# Patient Record
Sex: MALE | Race: WHITE | NOT HISPANIC OR LATINO | URBAN - METROPOLITAN AREA
[De-identification: names, ages, dates, MRNs, and addresses within clinical notes are randomized per-mention and may not be internally consistent; named-entity substitution may affect disease eponyms.]

---

## 2020-07-25 ENCOUNTER — TELEPHONE ENCOUNTER (OUTPATIENT)
Dept: URBAN - METROPOLITAN AREA CLINIC 13 | Facility: CLINIC | Age: 85
End: 2020-07-25

## 2020-07-25 RX ORDER — NITAZOXANIDE 500 MG/1
TAKE 1 TABLET TWICE DAILY TABLET ORAL
Qty: 6 | Refills: 0 | OUTPATIENT
Start: 2012-08-27 | End: 2012-11-09

## 2020-07-25 RX ORDER — LEVALBUTEROL TARTRATE 45 UG/1
TAKE 2 PUFFS TWICE DAILY AEROSOL, METERED ORAL
Refills: 0 | OUTPATIENT
Start: 2008-10-17 | End: 2011-10-05

## 2020-07-25 RX ORDER — HYDROCORTISONE ACETATE 25 MG/1
INSERT 1 SUPP BEDTIME  PR X 7 DAYS SUPPOSITORY RECTAL
Qty: 10 | Refills: 0 | OUTPATIENT
Start: 2013-11-12 | End: 2014-01-22

## 2020-07-25 RX ORDER — POTASSIUM CHLORIDE 20 MEQ/1
TAKE 1 TABLET DAILY TABLET, EXTENDED RELEASE ORAL
Refills: 0 | OUTPATIENT
End: 2014-01-22

## 2020-07-25 RX ORDER — HYDROCORTISONE ACETATE 25 MG/1
INSERT 1 SUPP BEDTIME X 10 DAYS SUPPOSITORY RECTAL
Qty: 10 | Refills: 1 | OUTPATIENT
Start: 2013-12-18 | End: 2014-01-22

## 2020-07-25 RX ORDER — CHOLESTYRAMINE 4 G/9G
TAKE 1 SCOOP DAILY POWDER, FOR SUSPENSION ORAL
Qty: 30 | Refills: 5 | OUTPATIENT
Start: 2012-06-25 | End: 2017-04-26

## 2020-07-25 RX ORDER — ATORVASTATIN CALCIUM 40 MG/1
TABLET, FILM COATED ORAL
Qty: 90 | Refills: 0 | OUTPATIENT
Start: 2012-12-03 | End: 2013-11-12

## 2020-07-25 RX ORDER — CHOLESTYRAMINE 4 G/9G
POWDER, FOR SUSPENSION ORAL
Qty: 255 | Refills: 0 | OUTPATIENT
Start: 2012-02-01 | End: 2012-06-25

## 2020-07-25 RX ORDER — LISINOPRIL 20 MG/1
TAKE 1 TABLET DAILY TABLET ORAL
Refills: 0 | OUTPATIENT
Start: 2012-04-03 | End: 2012-11-09

## 2020-07-25 RX ORDER — WHEAT DEXTRIN 3 G/4 G
USE AS DIRECTED POWDER (GRAM) ORAL
Refills: 0 | OUTPATIENT
End: 2012-08-27

## 2020-07-25 RX ORDER — MELOXICAM 7.5 MG/1
TAKE 1 TABLET DAILY TABLET ORAL
Refills: 0 | OUTPATIENT
End: 2017-04-26

## 2020-07-25 RX ORDER — ATORVASTATIN CALCIUM 80 MG
TAKE 1 TABLET AT BEDTIME TABLET ORAL
Refills: 0 | OUTPATIENT
Start: 2005-11-21 | End: 2011-10-05

## 2020-07-25 RX ORDER — TIMOLOL MALEATE 5 MG/ML
SOLUTION/ DROPS OPHTHALMIC
Qty: 10 | Refills: 0 | OUTPATIENT
Start: 2012-02-13 | End: 2013-11-12

## 2020-07-25 RX ORDER — OLMESARTAN MEDOXOMIL 20 MG/1
TABLET, FILM COATED ORAL
Qty: 30 | Refills: 0 | OUTPATIENT
Start: 2012-09-28 | End: 2013-03-04

## 2020-07-25 RX ORDER — DICLOFENAC SODIUM 1 %
APPLY SPARINGLY TO AFFECTED AREA(S) ONCE DAILY GEL (GRAM) TOPICAL
Refills: 0 | OUTPATIENT
Start: 2011-10-05 | End: 2014-01-22

## 2020-07-25 RX ORDER — SERTRALINE HCL 25 MG
TAKE 1 TABLET DAILY TABLET ORAL
Refills: 0 | OUTPATIENT
Start: 2005-11-21 | End: 2011-09-26

## 2020-07-25 RX ORDER — DUTASTERIDE 0.5 MG
TAKE 1 CAPSULE DAILY CAPSULE ORAL
Refills: 0 | OUTPATIENT
Start: 2005-11-21 | End: 2011-10-05

## 2020-07-26 ENCOUNTER — TELEPHONE ENCOUNTER (OUTPATIENT)
Dept: URBAN - METROPOLITAN AREA CLINIC 13 | Facility: CLINIC | Age: 85
End: 2020-07-26

## 2020-07-26 RX ORDER — AMOXICILLIN 250 MG/1
CAPSULE ORAL
Qty: 25 | Refills: 0 | Status: ACTIVE | COMMUNITY
Start: 2013-03-04

## 2020-07-26 RX ORDER — METOPROLOL SUCCINATE 25 MG/1
TABLET, EXTENDED RELEASE ORAL
Qty: 30 | Refills: 0 | Status: ACTIVE | COMMUNITY
Start: 2013-03-26

## 2020-07-26 RX ORDER — TIZANIDINE 4 MG/1
TAKE 1 TABLET DAILY TABLET ORAL
Refills: 0 | Status: ACTIVE | COMMUNITY

## 2020-07-26 RX ORDER — AZILSARTAN KAMEDOXOMIL AND CHLORTHALIDONE 40; 25 MG/1; MG/1
TABLET ORAL
Qty: 30 | Refills: 0 | Status: ACTIVE | COMMUNITY
Start: 2013-03-26

## 2020-07-26 RX ORDER — OLMESARTAN MEDOXOMIL 20 MG/1
TABLET, FILM COATED ORAL
Qty: 30 | Refills: 0 | Status: ACTIVE | COMMUNITY
Start: 2012-09-28

## 2020-07-26 RX ORDER — ARIPIPRAZOLE 2 MG/1
TABLET ORAL
Qty: 30 | Refills: 0 | Status: ACTIVE | COMMUNITY
Start: 2012-02-01

## 2020-07-26 RX ORDER — ARMODAFINIL 150 MG/1
TAKE 1 TABLET DAILY TABLET ORAL
Refills: 0 | Status: ACTIVE | COMMUNITY

## 2020-07-26 RX ORDER — OMEPRAZOLE 20 MG/1
TAKE 1 CAPSULE DAILY CAPSULE, DELAYED RELEASE ORAL
Refills: 0 | Status: ACTIVE | COMMUNITY
Start: 2011-06-02

## 2020-07-26 RX ORDER — RIVAROXABAN 20 MG/1
TABLET, FILM COATED ORAL
Qty: 90 | Refills: 0 | Status: ACTIVE | COMMUNITY
Start: 2014-01-03

## 2020-07-26 RX ORDER — SERTRALINE 100 MG/1
TAKE 1 TABLET DAILY TABLET, FILM COATED ORAL
Refills: 0 | Status: ACTIVE | COMMUNITY

## 2020-07-26 RX ORDER — ARMODAFINIL 150 MG/1
TABLET ORAL
Qty: 30 | Refills: 0 | Status: ACTIVE | COMMUNITY
Start: 2012-06-04

## 2020-07-26 RX ORDER — OLMESARTAN MEDOXOMIL 20 MG/1
TAKE 1 TABLET DAILY TABLET, FILM COATED ORAL
Refills: 0 | Status: ACTIVE | COMMUNITY

## 2020-07-26 RX ORDER — MODAFINIL 200 MG/1
TABLET ORAL
Qty: 30 | Refills: 0 | Status: ACTIVE | COMMUNITY
Start: 2012-04-25

## 2020-07-26 RX ORDER — ATORVASTATIN CALCIUM 20 MG/1
TAKE 1 TABLET DAILY TABLET, FILM COATED ORAL
Refills: 0 | Status: ACTIVE | COMMUNITY

## 2020-07-26 RX ORDER — CHOLESTYRAMINE 4 G/5.5G
TAKE 1 PACKET DAILY POWDER, FOR SUSPENSION ORAL
Refills: 0 | Status: ACTIVE | COMMUNITY

## 2020-07-26 RX ORDER — GABAPENTIN 300 MG/1
TAKE 1 CAPSULE 3 TIMES DAILY CAPSULE ORAL
Refills: 0 | Status: ACTIVE | COMMUNITY

## 2020-07-26 RX ORDER — HYDROCODONE BITARTRATE AND HOMATROPINE METHYLBROMIDE 5; 1.5 MG/5ML; MG/5ML
SOLUTION ORAL
Qty: 120 | Refills: 0 | Status: ACTIVE | COMMUNITY
Start: 2012-01-04

## 2020-07-26 RX ORDER — AZITHROMYCIN DIHYDRATE 250 MG/1
TABLET, FILM COATED ORAL
Qty: 7 | Refills: 0 | Status: ACTIVE | COMMUNITY
Start: 2012-01-04

## 2020-07-26 RX ORDER — ARIPIPRAZOLE 2 MG/1
TAKE 1 TABLET DAILY TABLET ORAL
Refills: 0 | Status: ACTIVE | COMMUNITY

## 2020-07-26 RX ORDER — AMLODIPINE BESYLATE 10 MG/1
TABLET ORAL
Qty: 30 | Refills: 0 | Status: ACTIVE | COMMUNITY
Start: 2013-10-29

## 2020-07-26 RX ORDER — LISINOPRIL 10 MG/1
TABLET ORAL
Qty: 30 | Refills: 0 | Status: ACTIVE | COMMUNITY
Start: 2012-03-22

## 2020-07-26 RX ORDER — BUDESONIDE AND FORMOTEROL FUMARATE DIHYDRATE 160; 4.5 UG/1; UG/1
AEROSOL RESPIRATORY (INHALATION)
Qty: 102 | Refills: 0 | Status: ACTIVE | COMMUNITY
Start: 2013-10-30

## 2021-03-31 ENCOUNTER — CLAIMS CREATED FROM THE CLAIM WINDOW (OUTPATIENT)
Dept: URBAN - METROPOLITAN AREA MEDICAL CENTER 43 | Facility: MEDICAL CENTER | Age: 86
End: 2021-03-31
Payer: COMMERCIAL

## 2021-03-31 DIAGNOSIS — K92.2 GASTROINTESTINAL HEMORRHAGE, UNSPECIFIED: ICD-10-CM

## 2021-03-31 DIAGNOSIS — D62 ACUTE POSTHEMORRHAGIC ANEMIA: ICD-10-CM

## 2021-03-31 DIAGNOSIS — R19.5 HEME POSITIVE STOOL: ICD-10-CM

## 2021-03-31 DIAGNOSIS — R93.5 ABNORMAL CT OF THE ABDOMEN: ICD-10-CM

## 2021-03-31 PROCEDURE — 99223 1ST HOSP IP/OBS HIGH 75: CPT | Performed by: INTERNAL MEDICINE

## 2021-04-01 ENCOUNTER — CLAIMS CREATED FROM THE CLAIM WINDOW (OUTPATIENT)
Dept: URBAN - METROPOLITAN AREA MEDICAL CENTER 43 | Facility: MEDICAL CENTER | Age: 86
End: 2021-04-01
Payer: COMMERCIAL

## 2021-04-01 DIAGNOSIS — K57.30 ACQUIRED DIVERTICULOSIS OF COLON: ICD-10-CM

## 2021-04-01 DIAGNOSIS — D12.3 ADENOMA OF TRANSVERSE COLON: ICD-10-CM

## 2021-04-01 DIAGNOSIS — D62 ACUTE POSTHEMORRHAGIC ANEMIA: ICD-10-CM

## 2021-04-01 DIAGNOSIS — D12.0 BENIGN NEOPLASM OF CECUM: ICD-10-CM

## 2021-04-01 DIAGNOSIS — K92.1 BLACK STOOL: ICD-10-CM

## 2021-04-01 DIAGNOSIS — D12.2 BENIGN NEOPLASM OF ASCENDING COLON: ICD-10-CM

## 2021-04-01 DIAGNOSIS — D12.4 ADENOMA OF DESCENDING COLON: ICD-10-CM

## 2021-04-01 PROCEDURE — 45385 COLONOSCOPY W/LESION REMOVAL: CPT | Performed by: INTERNAL MEDICINE

## 2021-04-02 ENCOUNTER — CLAIMS CREATED FROM THE CLAIM WINDOW (OUTPATIENT)
Dept: URBAN - METROPOLITAN AREA MEDICAL CENTER 43 | Facility: MEDICAL CENTER | Age: 86
End: 2021-04-02
Payer: COMMERCIAL

## 2021-04-02 DIAGNOSIS — D12.2 BENIGN NEOPLASM OF ASCENDING COLON: ICD-10-CM

## 2021-04-02 DIAGNOSIS — D62 ACUTE POSTHEMORRHAGIC ANEMIA: ICD-10-CM

## 2021-04-02 DIAGNOSIS — D12.4 ADENOMA OF DESCENDING COLON: ICD-10-CM

## 2021-04-02 DIAGNOSIS — D12.0 BENIGN NEOPLASM OF CECUM: ICD-10-CM

## 2021-04-02 PROCEDURE — 99232 SBSQ HOSP IP/OBS MODERATE 35: CPT | Performed by: INTERNAL MEDICINE

## 2021-04-08 ENCOUNTER — WEB ENCOUNTER (OUTPATIENT)
Dept: URBAN - METROPOLITAN AREA CLINIC 113 | Facility: CLINIC | Age: 86
End: 2021-04-08

## 2021-04-08 ENCOUNTER — OFFICE VISIT (OUTPATIENT)
Dept: URBAN - METROPOLITAN AREA CLINIC 113 | Facility: CLINIC | Age: 86
End: 2021-04-08
Payer: COMMERCIAL

## 2021-04-08 VITALS
HEART RATE: 93 BPM | DIASTOLIC BLOOD PRESSURE: 66 MMHG | SYSTOLIC BLOOD PRESSURE: 124 MMHG | HEIGHT: 74 IN | WEIGHT: 151 LBS | BODY MASS INDEX: 19.38 KG/M2 | TEMPERATURE: 98.2 F

## 2021-04-08 DIAGNOSIS — K57.91 GASTROINTESTINAL HEMORRHAGE ASSOCIATED WITH INTESTINAL DIVERTICULOSIS: ICD-10-CM

## 2021-04-08 DIAGNOSIS — D12.6 TUBULAR ADENOMA OF COLON: ICD-10-CM

## 2021-04-08 DIAGNOSIS — D62 ANEMIA ASSOCIATED WITH ACUTE BLOOD LOSS: ICD-10-CM

## 2021-04-08 DIAGNOSIS — K57.31 DIVERTICULOSIS LARGE INTESTINE W/O PERFORATION OR ABSCESS W/BLEEDING: ICD-10-CM

## 2021-04-08 PROBLEM — 267530009: Status: ACTIVE | Noted: 2021-04-07

## 2021-04-08 PROBLEM — 74474003: Status: ACTIVE | Noted: 2021-04-07

## 2021-04-08 PROCEDURE — 99213 OFFICE O/P EST LOW 20 MIN: CPT | Performed by: INTERNAL MEDICINE

## 2021-04-08 RX ORDER — ROSUVASTATIN CALCIUM 10 MG/1
1 TABLET TABLET, FILM COATED ORAL ONCE A DAY
Status: ACTIVE | COMMUNITY

## 2021-04-08 RX ORDER — HYDROCODONE BITARTRATE AND HOMATROPINE METHYLBROMIDE 5; 1.5 MG/5ML; MG/5ML
SOLUTION ORAL
Qty: 120 | Refills: 0 | Status: ON HOLD | COMMUNITY
Start: 2012-01-04

## 2021-04-08 RX ORDER — OMEPRAZOLE 20 MG/1
TAKE 1 CAPSULE DAILY CAPSULE, DELAYED RELEASE ORAL
Refills: 0 | Status: ON HOLD | COMMUNITY
Start: 2011-06-02

## 2021-04-08 RX ORDER — ARMODAFINIL 150 MG/1
TAKE 1 TABLET DAILY TABLET ORAL
Refills: 0 | Status: ON HOLD | COMMUNITY

## 2021-04-08 RX ORDER — SERTRALINE 100 MG/1
TAKE 1 TABLET DAILY TABLET, FILM COATED ORAL
Refills: 0 | Status: ON HOLD | COMMUNITY

## 2021-04-08 RX ORDER — RIVAROXABAN 20 MG/1
TABLET, FILM COATED ORAL
Qty: 90 | Refills: 0 | Status: ON HOLD | COMMUNITY
Start: 2014-01-03

## 2021-04-08 RX ORDER — ASPIRIN 81 MG/1
1 TABLET TABLET ORAL ONCE A DAY
Status: ACTIVE | COMMUNITY

## 2021-04-08 RX ORDER — APIXABAN 2.5 MG/1
1 TABLET TABLET, FILM COATED ORAL TWICE DAILY
Status: ON HOLD | COMMUNITY

## 2021-04-08 RX ORDER — AZILSARTAN KAMEDOXOMIL AND CHLORTHALIDONE 40; 25 MG/1; MG/1
TABLET ORAL
Qty: 30 | Refills: 0 | Status: ON HOLD | COMMUNITY
Start: 2013-03-26

## 2021-04-08 RX ORDER — MODAFINIL 200 MG/1
TABLET ORAL
Qty: 30 | Refills: 0 | Status: ON HOLD | COMMUNITY
Start: 2012-04-25

## 2021-04-08 RX ORDER — CHOLESTYRAMINE 4 G/5.5G
TAKE 1 PACKET DAILY POWDER, FOR SUSPENSION ORAL
Refills: 0 | Status: ON HOLD | COMMUNITY

## 2021-04-08 RX ORDER — ESZOPICLONE 2 MG/1
1 TABLET IMMEDIATELY BEFORE BEDTIME TABLET, FILM COATED ORAL ONCE A DAY
Status: ACTIVE | COMMUNITY

## 2021-04-08 RX ORDER — LISINOPRIL 10 MG/1
TABLET ORAL
Qty: 30 | Refills: 0 | Status: ON HOLD | COMMUNITY
Start: 2012-03-22

## 2021-04-08 RX ORDER — AZITHROMYCIN DIHYDRATE 250 MG/1
TABLET, FILM COATED ORAL
Qty: 7 | Refills: 0 | Status: ON HOLD | COMMUNITY
Start: 2012-01-04

## 2021-04-08 RX ORDER — ATORVASTATIN CALCIUM 20 MG/1
TAKE 1 TABLET DAILY TABLET, FILM COATED ORAL
Refills: 0 | Status: ON HOLD | COMMUNITY

## 2021-04-08 RX ORDER — PANTOPRAZOLE SODIUM 40 MG/1
1 TABLET TABLET, DELAYED RELEASE ORAL ONCE A DAY
Status: ACTIVE | COMMUNITY

## 2021-04-08 RX ORDER — OLMESARTAN MEDOXOMIL 20 MG/1
TAKE 1 TABLET DAILY TABLET, FILM COATED ORAL
Refills: 0 | Status: ON HOLD | COMMUNITY

## 2021-04-08 RX ORDER — GABAPENTIN 300 MG/1
TAKE 1 CAPSULE 3 TIMES DAILY CAPSULE ORAL
Refills: 0 | Status: ON HOLD | COMMUNITY

## 2021-04-08 RX ORDER — BUDESONIDE AND FORMOTEROL FUMARATE DIHYDRATE 160; 4.5 UG/1; UG/1
AEROSOL RESPIRATORY (INHALATION)
Qty: 102 | Refills: 0 | Status: ON HOLD | COMMUNITY
Start: 2013-10-30

## 2021-04-08 RX ORDER — TIZANIDINE 4 MG/1
TAKE 1 TABLET DAILY TABLET ORAL
Refills: 0 | Status: ON HOLD | COMMUNITY

## 2021-04-08 RX ORDER — METOPROLOL SUCCINATE 25 MG/1
TABLET, EXTENDED RELEASE ORAL
Qty: 30 | Refills: 0 | Status: ON HOLD | COMMUNITY
Start: 2013-03-26

## 2021-04-08 RX ORDER — AMOXICILLIN 250 MG/1
CAPSULE ORAL
Qty: 25 | Refills: 0 | Status: ON HOLD | COMMUNITY
Start: 2013-03-04

## 2021-04-08 RX ORDER — ARIPIPRAZOLE 2 MG/1
TAKE 1 TABLET DAILY TABLET ORAL
Refills: 0 | Status: ON HOLD | COMMUNITY

## 2021-04-08 RX ORDER — AMLODIPINE BESYLATE 10 MG/1
TABLET ORAL
Qty: 30 | Refills: 0 | Status: ACTIVE | COMMUNITY
Start: 2013-10-29

## 2021-04-08 NOTE — HPI-TODAY'S VISIT:
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 Wing, vestibular dysfunction, hypertension, coronary artery disease status post coronary stents, who was admitted to the hospital on 3/31/2021 for lower GI bleeding manifested by hematochezia.  He had a CT scan of the abdomen and pelvis without contrast that revealed short segment colitis in the descending colon/transverse colon region and mild thickening of the proximal stomach.  His hemoglobin was 6.9.  He received transfusion.  He had a tagged red cell study that was negative.  He continued to have bleeding and subsequently underwent colonoscopy examination on 4/1/2021 that revealed a normal terminal ileum with no blood coming from the ileum.  Large grade 1 internal hemorrhoids, pancolonic diverticulosis but no bleeding or blood seen in the colon.  There was a 6 mm polyp in the cecum, a 5 mm polyp in the ascending colon, a 15 mm mid ascending colon polyp removed in a piecemeal fashion.  This was semisessile.  He had 2 pedunculated/semipedunculated polyps 20 to 25 mm in size in the transverse colon these were removed by hot snare clips x2 were placed.  He had 3 polyps in the descending colon ranging in size from 6 to 10 mm these were removed by cold snare.  There was a 15 mm polyp in the descending colon that was semisessile this was removed in a piecemeal fashion.  There was a colocolonic anastomosis in the rectosigmoid at 18 cm. Blood work on 4/6/2021 revealed a hemoglobin 9.3, WBC of 6.3 and platelet count of 192,000. He is doing okay currently.  He does feel weak but denies any chest pain or shortness of breath lightheadedness or syncope.  He did have a bowel movement on Tuesday that had some blood in it.  The bowel movement was dark red.  He's had no bowel movement since.  He denies any heartburn, dysphagia, abdominal pain, nausea or vomiting.

## 2021-04-12 ENCOUNTER — LAB OUTSIDE AN ENCOUNTER (OUTPATIENT)
Dept: URBAN - METROPOLITAN AREA CLINIC 113 | Facility: CLINIC | Age: 86
End: 2021-04-12

## 2021-04-14 ENCOUNTER — CLAIMS CREATED FROM THE CLAIM WINDOW (OUTPATIENT)
Dept: URBAN - METROPOLITAN AREA MEDICAL CENTER 43 | Facility: MEDICAL CENTER | Age: 86
End: 2021-04-14
Payer: COMMERCIAL

## 2021-04-14 DIAGNOSIS — K92.1 MELENA: ICD-10-CM

## 2021-04-14 DIAGNOSIS — D62 ACUTE POSTHEMORRHAGIC ANEMIA: ICD-10-CM

## 2021-04-14 DIAGNOSIS — K64.8 OTHER HEMORRHOIDS: ICD-10-CM

## 2021-04-14 PROCEDURE — 99222 1ST HOSP IP/OBS MODERATE 55: CPT | Performed by: INTERNAL MEDICINE

## 2021-04-15 ENCOUNTER — CLAIMS CREATED FROM THE CLAIM WINDOW (OUTPATIENT)
Dept: URBAN - METROPOLITAN AREA MEDICAL CENTER 43 | Facility: MEDICAL CENTER | Age: 86
End: 2021-04-15
Payer: COMMERCIAL

## 2021-04-15 DIAGNOSIS — D62 ACUTE POSTHEMORRHAGIC ANEMIA: ICD-10-CM

## 2021-04-15 DIAGNOSIS — K92.2 GASTROINTESTINAL HEMORRHAGE, UNSPECIFIED: ICD-10-CM

## 2021-04-15 DIAGNOSIS — K64.0 FIRST DEGREE HEMORRHOIDS: ICD-10-CM

## 2021-04-15 DIAGNOSIS — K57.30 DIVERTICULOSIS OF LARGE INTESTINE WITHOUT PERFORATION OR ABSCESS WITHOUT BLEEDING: ICD-10-CM

## 2021-04-15 PROCEDURE — 99232 SBSQ HOSP IP/OBS MODERATE 35: CPT | Performed by: INTERNAL MEDICINE

## 2021-04-22 ENCOUNTER — DASHBOARD ENCOUNTERS (OUTPATIENT)
Age: 86
End: 2021-04-22

## 2021-04-22 ENCOUNTER — OFFICE VISIT (OUTPATIENT)
Dept: URBAN - METROPOLITAN AREA CLINIC 113 | Facility: CLINIC | Age: 86
End: 2021-04-22
Payer: COMMERCIAL

## 2021-04-22 VITALS
BODY MASS INDEX: 19.25 KG/M2 | TEMPERATURE: 97.8 F | WEIGHT: 150 LBS | SYSTOLIC BLOOD PRESSURE: 137 MMHG | HEIGHT: 74 IN | DIASTOLIC BLOOD PRESSURE: 62 MMHG | HEART RATE: 82 BPM

## 2021-04-22 DIAGNOSIS — D50.0 ANEMIA DUE TO GASTROINTESTINAL BLOOD LOSS: ICD-10-CM

## 2021-04-22 DIAGNOSIS — D12.6 TUBULAR ADENOMA OF COLON: ICD-10-CM

## 2021-04-22 DIAGNOSIS — K57.31 DIVERTICULOSIS LARGE INTESTINE W/O PERFORATION OR ABSCESS W/BLEEDING: ICD-10-CM

## 2021-04-22 DIAGNOSIS — K59.01 SLOW TRANSIT CONSTIPATION: ICD-10-CM

## 2021-04-22 PROCEDURE — 99213 OFFICE O/P EST LOW 20 MIN: CPT | Performed by: INTERNAL MEDICINE

## 2021-04-22 RX ORDER — AZILSARTAN KAMEDOXOMIL AND CHLORTHALIDONE 40; 25 MG/1; MG/1
TABLET ORAL
Qty: 30 | Refills: 0 | Status: ON HOLD | COMMUNITY
Start: 2013-03-26

## 2021-04-22 RX ORDER — ARMODAFINIL 150 MG/1
TAKE 1 TABLET DAILY TABLET ORAL
Refills: 0 | Status: ON HOLD | COMMUNITY

## 2021-04-22 RX ORDER — SERTRALINE 100 MG/1
TAKE 1 TABLET DAILY TABLET, FILM COATED ORAL
Refills: 0 | Status: ON HOLD | COMMUNITY

## 2021-04-22 RX ORDER — BUDESONIDE AND FORMOTEROL FUMARATE DIHYDRATE 160; 4.5 UG/1; UG/1
AEROSOL RESPIRATORY (INHALATION)
Qty: 102 | Refills: 0 | Status: ON HOLD | COMMUNITY
Start: 2013-10-30

## 2021-04-22 RX ORDER — APIXABAN 2.5 MG/1
1 TABLET TABLET, FILM COATED ORAL TWICE DAILY
Status: ON HOLD | COMMUNITY

## 2021-04-22 RX ORDER — PANTOPRAZOLE SODIUM 40 MG/1
1 TABLET TABLET, DELAYED RELEASE ORAL ONCE A DAY
Status: ACTIVE | COMMUNITY

## 2021-04-22 RX ORDER — ESZOPICLONE 2 MG/1
1 TABLET IMMEDIATELY BEFORE BEDTIME TABLET, FILM COATED ORAL ONCE A DAY
Status: ACTIVE | COMMUNITY

## 2021-04-22 RX ORDER — AMOXICILLIN 250 MG/1
CAPSULE ORAL
Qty: 25 | Refills: 0 | Status: ON HOLD | COMMUNITY
Start: 2013-03-04

## 2021-04-22 RX ORDER — TIZANIDINE 4 MG/1
TAKE 1 TABLET DAILY TABLET ORAL
Refills: 0 | Status: ON HOLD | COMMUNITY

## 2021-04-22 RX ORDER — ASPIRIN 81 MG/1
1 TABLET TABLET ORAL ONCE A DAY
Status: ACTIVE | COMMUNITY

## 2021-04-22 RX ORDER — METOPROLOL SUCCINATE 25 MG/1
TABLET, EXTENDED RELEASE ORAL
Qty: 30 | Refills: 0 | Status: ON HOLD | COMMUNITY
Start: 2013-03-26

## 2021-04-22 RX ORDER — MODAFINIL 200 MG/1
TABLET ORAL
Qty: 30 | Refills: 0 | Status: ON HOLD | COMMUNITY
Start: 2012-04-25

## 2021-04-22 RX ORDER — HYDROCODONE BITARTRATE AND HOMATROPINE METHYLBROMIDE 5; 1.5 MG/5ML; MG/5ML
SOLUTION ORAL
Qty: 120 | Refills: 0 | Status: ON HOLD | COMMUNITY
Start: 2012-01-04

## 2021-04-22 RX ORDER — AMLODIPINE BESYLATE 10 MG/1
TABLET ORAL
Qty: 30 | Refills: 0 | Status: ACTIVE | COMMUNITY
Start: 2013-10-29

## 2021-04-22 RX ORDER — CHOLESTYRAMINE 4 G/5.5G
TAKE 1 PACKET DAILY POWDER, FOR SUSPENSION ORAL
Refills: 0 | Status: ON HOLD | COMMUNITY

## 2021-04-22 RX ORDER — ATORVASTATIN CALCIUM 20 MG/1
TAKE 1 TABLET DAILY TABLET, FILM COATED ORAL
Refills: 0 | Status: ON HOLD | COMMUNITY

## 2021-04-22 RX ORDER — ROSUVASTATIN CALCIUM 10 MG/1
1 TABLET TABLET, FILM COATED ORAL ONCE A DAY
Status: ACTIVE | COMMUNITY

## 2021-04-22 RX ORDER — ARIPIPRAZOLE 2 MG/1
TAKE 1 TABLET DAILY TABLET ORAL
Refills: 0 | Status: ON HOLD | COMMUNITY

## 2021-04-22 RX ORDER — GABAPENTIN 300 MG/1
TAKE 1 CAPSULE 3 TIMES DAILY CAPSULE ORAL
Refills: 0 | Status: ON HOLD | COMMUNITY

## 2021-04-22 RX ORDER — OMEPRAZOLE 20 MG/1
TAKE 1 CAPSULE DAILY CAPSULE, DELAYED RELEASE ORAL
Refills: 0 | Status: ON HOLD | COMMUNITY
Start: 2011-06-02

## 2021-04-22 RX ORDER — AZITHROMYCIN DIHYDRATE 250 MG/1
TABLET, FILM COATED ORAL
Qty: 7 | Refills: 0 | Status: ON HOLD | COMMUNITY
Start: 2012-01-04

## 2021-04-22 RX ORDER — LISINOPRIL 10 MG/1
TABLET ORAL
Qty: 30 | Refills: 0 | Status: ON HOLD | COMMUNITY
Start: 2012-03-22

## 2021-04-22 RX ORDER — OLMESARTAN MEDOXOMIL 20 MG/1
TAKE 1 TABLET DAILY TABLET, FILM COATED ORAL
Refills: 0 | Status: ON HOLD | COMMUNITY

## 2021-04-22 RX ORDER — RIVAROXABAN 20 MG/1
TABLET, FILM COATED ORAL
Qty: 90 | Refills: 0 | Status: ON HOLD | COMMUNITY
Start: 2014-01-03

## 2021-04-22 NOTE — HPI-TODAY'S VISIT:
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 Wing, vestibular dysfunction, hypertension, coronary artery disease status post coronary stents, who was admitted to the hospital on 4/13/2021 for hemoglobin of 6.7 as evidenced on labs ordered by his [facility] provider.  He was last seen on 4/08/2021 for for follow up after hospital admission for lower GI bleed manifested by hematochezia. At that time CT scan a/p withou contrast revealed short segment colitis in the descending colon/transverse colon region and mild thickening of the proximal stomach. He was noted to have a hemoglobin of 6.9 requiring transfusion. He underwent  colonoscopy on 4/1/2021 for continued bleeding which revealed a normal terminal ileum with no blood coming from the ileum/ colonic diverticulosis without evidence of bleeding; Adenomtous colon polyps, tubulovillous aadenomtous polyps. He has multiple large polyps up to 20-25mm in size, requiring removal. He was off Eliqis at that time. Blood work on 4/6/2021 revealed a hemoglobin 9.3, WBC of 6.3 and platelet count of 192,000. At time of follow up he was doing well. He reported a small bowel movement with a some blood. He denied chest pain or shortness of breath. Patient and his scot were given strict precautions to follow his symptoms. Repeat labs recommended to reassess H/H.   Per referral notes, pt was seen by his PCP on 4/13/2021 for follow up. At that time his hemoglobin was 6.7. He was directly admitted to the hospital and recieved 1 unit PRBC. Symptoms were suspected to be secondary to GI bleed post polypectomy.  Today he states he is doing pretty well overall. He reports slight fatigue and dyspnea on exertion. Denies chest pain. He has not had a bowel movment since his discharge, despite MiraLAX and OTC laxatives. He denies abdominal pain, nausea, or vomiting. He remains off Eliquis.

## 2021-04-22 NOTE — HPI-OTHER HISTORIES
Non-bleeding internal hemorrhoids. Diverticulosis in the entire examined colon. One 6 mm polyp in the cecum, removed with cold snare. One 5 mm polyp in the ascending colon, removed with a cold snare. One 15 mm polyp in the mid ascending colon, removed piecemeal using a cold snare. Two 20-25mm polyps in the transverse colon, removed with hot snare; clips were placed. Three 6 to 10mm polyps in the descending colon, removed with a cold snare. One 15 mm polyp in the descending colon, removed with using a cold sanre. Patent end-to-end colo-colonic anastomosis, characterized by healthy appearing muscoa.

## 2021-04-28 PROBLEM — 35298007: Status: ACTIVE | Noted: 2021-04-22

## 2021-04-28 PROBLEM — 444898006: Status: ACTIVE | Noted: 2021-04-08

## 2021-04-28 PROBLEM — 1086601000119100: Status: ACTIVE | Noted: 2021-04-07

## 2021-04-28 PROBLEM — 413532003: Status: ACTIVE | Noted: 2021-04-22

## 2021-05-06 ENCOUNTER — OFFICE VISIT (OUTPATIENT)
Dept: URBAN - METROPOLITAN AREA CLINIC 113 | Facility: CLINIC | Age: 86
End: 2021-05-06

## 2021-05-06 RX ORDER — BUDESONIDE AND FORMOTEROL FUMARATE DIHYDRATE 160; 4.5 UG/1; UG/1
AEROSOL RESPIRATORY (INHALATION)
Qty: 102 | Refills: 0 | Status: ON HOLD | COMMUNITY
Start: 2013-10-30

## 2021-05-06 RX ORDER — CHOLESTYRAMINE 4 G/5.5G
TAKE 1 PACKET DAILY POWDER, FOR SUSPENSION ORAL
Refills: 0 | Status: ON HOLD | COMMUNITY

## 2021-05-06 RX ORDER — AMOXICILLIN 250 MG/1
CAPSULE ORAL
Qty: 25 | Refills: 0 | Status: ON HOLD | COMMUNITY
Start: 2013-03-04

## 2021-05-06 RX ORDER — ROSUVASTATIN CALCIUM 10 MG/1
1 TABLET TABLET, FILM COATED ORAL ONCE A DAY
Status: ACTIVE | COMMUNITY

## 2021-05-06 RX ORDER — TIZANIDINE 4 MG/1
TAKE 1 TABLET DAILY TABLET ORAL
Refills: 0 | Status: ON HOLD | COMMUNITY

## 2021-05-06 RX ORDER — AZITHROMYCIN DIHYDRATE 250 MG/1
TABLET, FILM COATED ORAL
Qty: 7 | Refills: 0 | Status: ON HOLD | COMMUNITY
Start: 2012-01-04

## 2021-05-06 RX ORDER — PANTOPRAZOLE SODIUM 40 MG/1
1 TABLET TABLET, DELAYED RELEASE ORAL ONCE A DAY
Status: ACTIVE | COMMUNITY

## 2021-05-06 RX ORDER — GABAPENTIN 300 MG/1
TAKE 1 CAPSULE 3 TIMES DAILY CAPSULE ORAL
Refills: 0 | Status: ON HOLD | COMMUNITY

## 2021-05-06 RX ORDER — ASPIRIN 81 MG/1
1 TABLET TABLET ORAL ONCE A DAY
Status: ACTIVE | COMMUNITY

## 2021-05-06 RX ORDER — LISINOPRIL 10 MG/1
TABLET ORAL
Qty: 30 | Refills: 0 | Status: ON HOLD | COMMUNITY
Start: 2012-03-22

## 2021-05-06 RX ORDER — METOPROLOL SUCCINATE 25 MG/1
TABLET, EXTENDED RELEASE ORAL
Qty: 30 | Refills: 0 | Status: ON HOLD | COMMUNITY
Start: 2013-03-26

## 2021-05-06 RX ORDER — APIXABAN 2.5 MG/1
1 TABLET TABLET, FILM COATED ORAL TWICE DAILY
Status: ON HOLD | COMMUNITY

## 2021-05-06 RX ORDER — MODAFINIL 200 MG/1
TABLET ORAL
Qty: 30 | Refills: 0 | Status: ON HOLD | COMMUNITY
Start: 2012-04-25

## 2021-05-06 RX ORDER — RIVAROXABAN 20 MG/1
TABLET, FILM COATED ORAL
Qty: 90 | Refills: 0 | Status: ON HOLD | COMMUNITY
Start: 2014-01-03

## 2021-05-06 RX ORDER — OMEPRAZOLE 20 MG/1
TAKE 1 CAPSULE DAILY CAPSULE, DELAYED RELEASE ORAL
Refills: 0 | Status: ON HOLD | COMMUNITY
Start: 2011-06-02

## 2021-05-06 RX ORDER — AZILSARTAN KAMEDOXOMIL AND CHLORTHALIDONE 40; 25 MG/1; MG/1
TABLET ORAL
Qty: 30 | Refills: 0 | Status: ON HOLD | COMMUNITY
Start: 2013-03-26

## 2021-05-06 RX ORDER — AMLODIPINE BESYLATE 10 MG/1
TABLET ORAL
Qty: 30 | Refills: 0 | Status: ACTIVE | COMMUNITY
Start: 2013-10-29

## 2021-05-06 RX ORDER — SERTRALINE 100 MG/1
TAKE 1 TABLET DAILY TABLET, FILM COATED ORAL
Refills: 0 | Status: ON HOLD | COMMUNITY

## 2021-05-06 RX ORDER — ARIPIPRAZOLE 2 MG/1
TAKE 1 TABLET DAILY TABLET ORAL
Refills: 0 | Status: ON HOLD | COMMUNITY

## 2021-05-06 RX ORDER — HYDROCODONE BITARTRATE AND HOMATROPINE METHYLBROMIDE 5; 1.5 MG/5ML; MG/5ML
SOLUTION ORAL
Qty: 120 | Refills: 0 | Status: ON HOLD | COMMUNITY
Start: 2012-01-04

## 2021-05-06 RX ORDER — ATORVASTATIN CALCIUM 20 MG/1
TAKE 1 TABLET DAILY TABLET, FILM COATED ORAL
Refills: 0 | Status: ON HOLD | COMMUNITY

## 2021-05-06 RX ORDER — ARMODAFINIL 150 MG/1
TAKE 1 TABLET DAILY TABLET ORAL
Refills: 0 | Status: ON HOLD | COMMUNITY

## 2021-05-06 RX ORDER — OLMESARTAN MEDOXOMIL 20 MG/1
TAKE 1 TABLET DAILY TABLET, FILM COATED ORAL
Refills: 0 | Status: ON HOLD | COMMUNITY

## 2021-05-06 RX ORDER — ESZOPICLONE 2 MG/1
1 TABLET IMMEDIATELY BEFORE BEDTIME TABLET, FILM COATED ORAL ONCE A DAY
Status: ACTIVE | COMMUNITY

## 2021-05-06 NOTE — HPI-TODAY'S VISIT:
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 Wing, vestibular dysfunction, hypertension, coronary artery disease status post coronary stents, who was admitted to the hospital on 4/13/2021 for hemoglobin of 6.7 as evidenced on labs ordered by his [facility] provider.  He was last seen in this office on 4/22/2021 for follow-up of anemia.  At that time he was noted to have been admitted to the hospital for abnormal labs with hemoglobin of 6.7.  During his hospitalization he received 1 unit of PRBCs.  His symptoms were suspected to be due secondary to GI bleed post polypectomy.  At the time of follow-up he noted slight fatigue and dyspnea on exertion but denied chest pain.  He was instructed to remain off Eliquis.  Repeat labs to assess anemia or ordered.  Regarding his constipation, he was advised to begin MiraLAX and daily fiber.  Labs (4/22/2021): RBC 3.1, hemoglobin 8.8, hematocrit 27.1, MCV 87.0, MCH 28.2, MCHC 32.4, platelets 250; ferritin 33.6, iron 11, TIBC 352, iron saturation 3; B12 645.

## 2021-06-01 ENCOUNTER — OFFICE VISIT (OUTPATIENT)
Dept: URBAN - METROPOLITAN AREA CLINIC 113 | Facility: CLINIC | Age: 86
End: 2021-06-01

## 2021-09-27 ENCOUNTER — OFFICE VISIT (OUTPATIENT)
Dept: URBAN - METROPOLITAN AREA CLINIC 107 | Facility: CLINIC | Age: 86
End: 2021-09-27

## 2021-09-27 RX ORDER — CHOLESTYRAMINE 4 G/5.5G
TAKE 1 PACKET DAILY POWDER, FOR SUSPENSION ORAL
Refills: 0 | Status: ON HOLD | COMMUNITY

## 2021-09-27 RX ORDER — ARMODAFINIL 150 MG/1
TAKE 1 TABLET DAILY TABLET ORAL
Refills: 0 | Status: ON HOLD | COMMUNITY

## 2021-09-27 RX ORDER — AMOXICILLIN 250 MG/1
CAPSULE ORAL
Qty: 25 | Refills: 0 | Status: ON HOLD | COMMUNITY
Start: 2013-03-04

## 2021-09-27 RX ORDER — TIZANIDINE 4 MG/1
TAKE 1 TABLET DAILY TABLET ORAL
Refills: 0 | Status: ON HOLD | COMMUNITY

## 2021-09-27 RX ORDER — AZILSARTAN KAMEDOXOMIL AND CHLORTHALIDONE 40; 25 MG/1; MG/1
TABLET ORAL
Qty: 30 | Refills: 0 | Status: ON HOLD | COMMUNITY
Start: 2013-03-26

## 2021-09-27 RX ORDER — RIVAROXABAN 20 MG/1
TABLET, FILM COATED ORAL
Qty: 90 | Refills: 0 | Status: ON HOLD | COMMUNITY
Start: 2014-01-03

## 2021-09-27 RX ORDER — SERTRALINE 100 MG/1
TAKE 1 TABLET DAILY TABLET, FILM COATED ORAL
Refills: 0 | Status: ON HOLD | COMMUNITY

## 2021-09-27 RX ORDER — ASPIRIN 81 MG/1
1 TABLET TABLET ORAL ONCE A DAY
Status: ACTIVE | COMMUNITY

## 2021-09-27 RX ORDER — MODAFINIL 200 MG/1
TABLET ORAL
Qty: 30 | Refills: 0 | Status: ON HOLD | COMMUNITY
Start: 2012-04-25

## 2021-09-27 RX ORDER — AMLODIPINE BESYLATE 10 MG/1
TABLET ORAL
Qty: 30 | Refills: 0 | Status: ACTIVE | COMMUNITY
Start: 2013-10-29

## 2021-09-27 RX ORDER — ESZOPICLONE 2 MG/1
1 TABLET IMMEDIATELY BEFORE BEDTIME TABLET, FILM COATED ORAL ONCE A DAY
Status: ACTIVE | COMMUNITY

## 2021-09-27 RX ORDER — ATORVASTATIN CALCIUM 20 MG/1
TAKE 1 TABLET DAILY TABLET, FILM COATED ORAL
Refills: 0 | Status: ON HOLD | COMMUNITY

## 2021-09-27 RX ORDER — HYDROCODONE BITARTRATE AND HOMATROPINE METHYLBROMIDE 5; 1.5 MG/5ML; MG/5ML
SOLUTION ORAL
Qty: 120 | Refills: 0 | Status: ON HOLD | COMMUNITY
Start: 2012-01-04

## 2021-09-27 RX ORDER — AZITHROMYCIN DIHYDRATE 250 MG/1
TABLET, FILM COATED ORAL
Qty: 7 | Refills: 0 | Status: ON HOLD | COMMUNITY
Start: 2012-01-04

## 2021-09-27 RX ORDER — OMEPRAZOLE 20 MG/1
TAKE 1 CAPSULE DAILY CAPSULE, DELAYED RELEASE ORAL
Refills: 0 | Status: ON HOLD | COMMUNITY
Start: 2011-06-02

## 2021-09-27 RX ORDER — GABAPENTIN 300 MG/1
TAKE 1 CAPSULE 3 TIMES DAILY CAPSULE ORAL
Refills: 0 | Status: ON HOLD | COMMUNITY

## 2021-09-27 RX ORDER — LISINOPRIL 10 MG/1
TABLET ORAL
Qty: 30 | Refills: 0 | Status: ON HOLD | COMMUNITY
Start: 2012-03-22

## 2021-09-27 RX ORDER — APIXABAN 2.5 MG/1
1 TABLET TABLET, FILM COATED ORAL TWICE DAILY
Status: ON HOLD | COMMUNITY

## 2021-09-27 RX ORDER — PANTOPRAZOLE SODIUM 40 MG/1
1 TABLET TABLET, DELAYED RELEASE ORAL ONCE A DAY
Status: ACTIVE | COMMUNITY

## 2021-09-27 RX ORDER — BUDESONIDE AND FORMOTEROL FUMARATE DIHYDRATE 160; 4.5 UG/1; UG/1
AEROSOL RESPIRATORY (INHALATION)
Qty: 102 | Refills: 0 | Status: ON HOLD | COMMUNITY
Start: 2013-10-30

## 2021-09-27 RX ORDER — ARIPIPRAZOLE 2 MG/1
TAKE 1 TABLET DAILY TABLET ORAL
Refills: 0 | Status: ON HOLD | COMMUNITY

## 2021-09-27 RX ORDER — ROSUVASTATIN CALCIUM 10 MG/1
1 TABLET TABLET, FILM COATED ORAL ONCE A DAY
Status: ACTIVE | COMMUNITY

## 2021-09-27 RX ORDER — OLMESARTAN MEDOXOMIL 20 MG/1
TAKE 1 TABLET DAILY TABLET, FILM COATED ORAL
Refills: 0 | Status: ON HOLD | COMMUNITY

## 2021-09-27 RX ORDER — METOPROLOL SUCCINATE 25 MG/1
TABLET, EXTENDED RELEASE ORAL
Qty: 30 | Refills: 0 | Status: ON HOLD | COMMUNITY
Start: 2013-03-26

## 2021-09-27 NOTE — HPI-TODAY'S VISIT:
This is a 89-year-old male with a history of pulmonary embolus on Eliquis, sleep apnea on CPAP, abdominal aortic aneurysm status post stenting by Dr. Wing, vestibular dysfunction, hypertension, CAD status post stenting, presenting for evaluation of abdominal pain, loose stools and rectal pain. He was last seen in the office on 4/22/2021 after hospitalization for acute GI blood loss anemia with hemoglobin 6.7.  He was admitted and required 1 unit of packed red blood cells to be transfused.  Anemia was most likely due to post polypectomy bleeding.  He was discharged with hemoglobin 8.4.  He was also referred to hematology for anemia.  Labs on 4/22/2021 revealed a ferritin of 33.6, hemoglobin 8.8, MCV 87, platelets 250, serum iron 11, TIBC 352, percent saturation 30, B12 645. He was evaluated by hematology on 5/4/2021.  Anemia was felt to be secondary to gastrointestinal bleeding in the setting of chronic anticoagulation.  Parenteral iron was recommended. He was referred back to our office by Dr. Katty Maciel on 9/13/2021 for evaluation of rectal pain.